# Patient Record
Sex: MALE
[De-identification: names, ages, dates, MRNs, and addresses within clinical notes are randomized per-mention and may not be internally consistent; named-entity substitution may affect disease eponyms.]

---

## 2022-06-21 ENCOUNTER — APPOINTMENT (OUTPATIENT)
Dept: SURGERY | Facility: CLINIC | Age: 47
End: 2022-06-21

## 2022-06-21 PROBLEM — Z00.00 ENCOUNTER FOR PREVENTIVE HEALTH EXAMINATION: Status: ACTIVE | Noted: 2022-06-21

## 2023-12-14 PROBLEM — Z00.00 ENCOUNTER FOR PREVENTIVE HEALTH EXAMINATION: Status: ACTIVE | Noted: 2023-12-14

## 2023-12-19 ENCOUNTER — APPOINTMENT (OUTPATIENT)
Dept: CARDIOLOGY | Facility: CLINIC | Age: 48
End: 2023-12-19
Payer: MEDICAID

## 2023-12-19 VITALS
BODY MASS INDEX: 33.31 KG/M2 | HEIGHT: 63 IN | SYSTOLIC BLOOD PRESSURE: 130 MMHG | HEART RATE: 72 BPM | WEIGHT: 188 LBS | DIASTOLIC BLOOD PRESSURE: 90 MMHG | OXYGEN SATURATION: 97 %

## 2023-12-19 DIAGNOSIS — R06.02 SHORTNESS OF BREATH: ICD-10-CM

## 2023-12-19 DIAGNOSIS — I10 ESSENTIAL (PRIMARY) HYPERTENSION: ICD-10-CM

## 2023-12-19 DIAGNOSIS — E66.9 OBESITY, UNSPECIFIED: ICD-10-CM

## 2023-12-19 DIAGNOSIS — R07.9 CHEST PAIN, UNSPECIFIED: ICD-10-CM

## 2023-12-19 DIAGNOSIS — E78.5 HYPERLIPIDEMIA, UNSPECIFIED: ICD-10-CM

## 2023-12-19 PROCEDURE — 93000 ELECTROCARDIOGRAM COMPLETE: CPT

## 2023-12-19 PROCEDURE — 99204 OFFICE O/P NEW MOD 45 MIN: CPT | Mod: 25

## 2023-12-19 RX ORDER — AMLODIPINE BESYLATE 5 MG/1
5 TABLET ORAL
Refills: 0 | Status: ACTIVE | COMMUNITY

## 2023-12-19 RX ORDER — FAMOTIDINE 20 MG/1
20 TABLET, FILM COATED ORAL
Refills: 0 | Status: ACTIVE | COMMUNITY

## 2023-12-19 RX ORDER — ATORVASTATIN CALCIUM 20 MG/1
20 TABLET, FILM COATED ORAL
Refills: 0 | Status: ACTIVE | COMMUNITY

## 2023-12-21 NOTE — DISCUSSION/SUMMARY
[EKG obtained to assist in diagnosis and management of assessed problem(s)] : EKG obtained to assist in diagnosis and management of assessed problem(s) [FreeTextEntry1] : EKG performed today was unremarkable.  SOB on Exertion: Patient had an echocardiogram done. Will obtain prior records.  Chest Pain: Due to exertional nature of symptoms, recommend a stress echocardiogram to evaluate for exercise-induced symptoms.  HTN: The impression is hypertension. BP today is 130/90 mmHg. There are no changes in medication management. Continue current medical therapy. Other planned treatments include an exercise regimen, weight loss, low sodium diet, and alcohol moderation.  HLD: The impression is hyperlipidemia. There are no changes in medication management. Continue current medical therapy. Other planned treatment includes diet modification, exercise, and weight loss.  Instructed to follow up after testing is complete.  Plan was discussed with the patient.

## 2023-12-21 NOTE — HISTORY OF PRESENT ILLNESS
[FreeTextEntry1] : LESIA MICHAEL is a 48-year-old male, with a PMHx significant for HTN and HLD, who presents today for new patient evaluation. Patient complains of chest pain and SOB with exertion, relieved with rest. Chest pain is substernal in nature with radiation to the left arm. Denies any other symptoms. Otherwise: (-) diaphoresis, (-) pleuritic component, (-) ripping or tearing quality, (-) positional component, (-) dizziness, (-) syncope, (-) cough, (-) hemoptysis, (-) abdominal pain, (-) leg swelling/pain, (-) recent travel, (-) prolonged immobility.   Labs from 8/17/2023 reviewed:  Total Cholesterol: 168 HDL: 43 Triglycerides: 129 VLDL: 23 LDL: 102 T. Chol/HDL Ratio: 3.9

## 2024-01-04 ENCOUNTER — APPOINTMENT (OUTPATIENT)
Dept: CARDIOLOGY | Facility: CLINIC | Age: 49
End: 2024-01-04

## 2024-01-04 ENCOUNTER — APPOINTMENT (OUTPATIENT)
Dept: CARDIOLOGY | Facility: CLINIC | Age: 49
End: 2024-01-04
Payer: MEDICAID

## 2024-01-04 DIAGNOSIS — E78.00 PURE HYPERCHOLESTEROLEMIA, UNSPECIFIED: ICD-10-CM

## 2024-01-04 DIAGNOSIS — I10 ESSENTIAL (PRIMARY) HYPERTENSION: ICD-10-CM

## 2024-01-04 PROCEDURE — 93325 DOPPLER ECHO COLOR FLOW MAPG: CPT

## 2024-01-04 PROCEDURE — 99214 OFFICE O/P EST MOD 30 MIN: CPT

## 2024-01-04 PROCEDURE — 93320 DOPPLER ECHO COMPLETE: CPT

## 2024-01-04 PROCEDURE — 93351 STRESS TTE COMPLETE: CPT

## 2024-01-04 NOTE — DISCUSSION/SUMMARY
[FreeTextEntry1] : Stress echo performed today revealed no evidence of exercise-induced ischemia at 85% MPHR.   HTN: The impression is hypertension. Resting BP today is 130/80 mmHg. There are no changes in medication management. Continue current medical therapy. Other planned treatments include an exercise regimen, weight loss, low sodium diet, and alcohol moderation.  HLD: The impression is hyperlipidemia. There are no changes in medication management. Continue current medical therapy. Other planned treatment includes diet modification, exercise, and weight loss.  Instructed to follow up in 6 months. Plan was discussed with the patient.

## 2024-01-04 NOTE — REVIEW OF SYSTEMS
[Lower Ext Edema] : no extremity edema [Leg Claudication] : no intermittent leg claudication [Palpitations] : no palpitations [Syncope] : no syncope [Cough] : no cough [Negative] : Heme/Lymph [FreeTextEntry5] : (+) SOB on Exertion, (+) Chest Pain [FreeTextEntry6] : (+) SOB on Exertion

## 2024-01-04 NOTE — HISTORY OF PRESENT ILLNESS
[FreeTextEntry1] : LESIA MICHAEL is a 48-year-old male, with a PMHx significant for HTN and HLD, who presents today for stress echo. Patient complains of chest pain and SOB with exertion, relieved with rest. Chest pain is substernal in nature with radiation to the left arm. Denies any other symptoms. Otherwise: (-) diaphoresis, (-) pleuritic component, (-) ripping or tearing quality, (-) positional component, (-) dizziness, (-) syncope, (-) cough, (-) hemoptysis, (-) abdominal pain, (-) leg swelling/pain, (-) recent travel, (-) prolonged immobility.   Labs from 8/17/2023 reviewed:  Total Cholesterol: 168 HDL: 43 Triglycerides: 129 VLDL: 23 LDL: 102 T. Chol/HDL Ratio: 3.9